# Patient Record
Sex: FEMALE | Race: OTHER | HISPANIC OR LATINO
[De-identification: names, ages, dates, MRNs, and addresses within clinical notes are randomized per-mention and may not be internally consistent; named-entity substitution may affect disease eponyms.]

---

## 2017-01-05 ENCOUNTER — APPOINTMENT (OUTPATIENT)
Dept: ENDOCRINOLOGY | Facility: CLINIC | Age: 42
End: 2017-01-05

## 2017-01-05 VITALS
HEIGHT: 67 IN | SYSTOLIC BLOOD PRESSURE: 140 MMHG | WEIGHT: 141 LBS | TEMPERATURE: 98.2 F | BODY MASS INDEX: 22.13 KG/M2 | HEART RATE: 100 BPM | DIASTOLIC BLOOD PRESSURE: 80 MMHG

## 2017-01-06 LAB — GLUCOSE BLDC GLUCOMTR-MCNC: 182

## 2017-01-19 ENCOUNTER — LABORATORY RESULT (OUTPATIENT)
Age: 42
End: 2017-01-19

## 2017-01-23 ENCOUNTER — APPOINTMENT (OUTPATIENT)
Dept: ENDOCRINOLOGY | Facility: CLINIC | Age: 42
End: 2017-01-23

## 2017-01-23 VITALS
BODY MASS INDEX: 22.13 KG/M2 | DIASTOLIC BLOOD PRESSURE: 76 MMHG | TEMPERATURE: 98 F | HEIGHT: 67 IN | WEIGHT: 141 LBS | HEART RATE: 104 BPM | SYSTOLIC BLOOD PRESSURE: 134 MMHG

## 2017-01-23 LAB — GLUCOSE BLDC GLUCOMTR-MCNC: 83

## 2017-01-23 RX ORDER — INSULIN GLARGINE 100 [IU]/ML
100 INJECTION, SOLUTION SUBCUTANEOUS
Qty: 9 | Refills: 0 | Status: ACTIVE | COMMUNITY
Start: 2017-01-23 | End: 1900-01-01

## 2017-01-23 RX ORDER — INSULIN ASPART 100 [IU]/ML
100 INJECTION, SOLUTION INTRAVENOUS; SUBCUTANEOUS
Qty: 6 | Refills: 3 | Status: ACTIVE | COMMUNITY
Start: 2017-01-23 | End: 1900-01-01

## 2017-01-24 ENCOUNTER — FORM ENCOUNTER (OUTPATIENT)
Age: 42
End: 2017-01-24

## 2017-01-24 LAB
ALBUMIN SERPL ELPH-MCNC: 4.6 G/DL
ALP BLD-CCNC: 60 U/L
ALT SERPL-CCNC: 15 U/L
ANION GAP SERPL CALC-SCNC: 17 MMOL/L
APPEARANCE: ABNORMAL
AST SERPL-CCNC: 15 U/L
BASOPHILS # BLD AUTO: 0.05 K/UL
BASOPHILS NFR BLD AUTO: 1 %
BILIRUB SERPL-MCNC: 0.4 MG/DL
BILIRUBIN URINE: NEGATIVE
BLOOD URINE: NEGATIVE
BUN SERPL-MCNC: 14 MG/DL
CALCIUM SERPL-MCNC: 9.3 MG/DL
CHLORIDE SERPL-SCNC: 103 MMOL/L
CO2 SERPL-SCNC: 22 MMOL/L
COLOR: YELLOW
CREAT SERPL-MCNC: 0.74 MG/DL
EOSINOPHIL # BLD AUTO: 0.41 K/UL
EOSINOPHIL NFR BLD AUTO: 8.2 %
GLUCOSE QUALITATIVE U: NORMAL MG/DL
GLUCOSE SERPL-MCNC: 183 MG/DL
HBA1C MFR BLD HPLC: 8.1 %
HCT VFR BLD CALC: 39.5 %
HGB BLD-MCNC: 12.5 G/DL
IMM GRANULOCYTES NFR BLD AUTO: 0.4 %
INR PPP: 0.99 RATIO
KETONES URINE: NEGATIVE
LEUKOCYTE ESTERASE URINE: ABNORMAL
LYMPHOCYTES # BLD AUTO: 1.73 K/UL
LYMPHOCYTES NFR BLD AUTO: 34.8 %
MAN DIFF?: NORMAL
MCHC RBC-ENTMCNC: 25.5 PG
MCHC RBC-ENTMCNC: 31.6 GM/DL
MCV RBC AUTO: 80.6 FL
MONOCYTES # BLD AUTO: 0.61 K/UL
MONOCYTES NFR BLD AUTO: 12.3 %
NEUTROPHILS # BLD AUTO: 2.15 K/UL
NEUTROPHILS NFR BLD AUTO: 43.3 %
NITRITE URINE: NEGATIVE
PH URINE: 5
PLATELET # BLD AUTO: 269 K/UL
POTASSIUM SERPL-SCNC: 4.2 MMOL/L
PROT SERPL-MCNC: 7.8 G/DL
PROTEIN URINE: NEGATIVE MG/DL
PT BLD: 11.2 SEC
RBC # BLD: 4.9 M/UL
RBC # FLD: 14.2 %
SODIUM SERPL-SCNC: 142 MMOL/L
SPECIFIC GRAVITY URINE: 1.02
UROBILINOGEN URINE: NORMAL MG/DL
WBC # FLD AUTO: 4.97 K/UL

## 2017-01-25 ENCOUNTER — OUTPATIENT (OUTPATIENT)
Dept: OUTPATIENT SERVICES | Facility: HOSPITAL | Age: 42
LOS: 1 days | End: 2017-01-25
Payer: COMMERCIAL

## 2017-01-25 ENCOUNTER — APPOINTMENT (OUTPATIENT)
Dept: INTERNAL MEDICINE | Facility: CLINIC | Age: 42
End: 2017-01-25

## 2017-01-25 VITALS
WEIGHT: 142 LBS | BODY MASS INDEX: 22.29 KG/M2 | DIASTOLIC BLOOD PRESSURE: 76 MMHG | TEMPERATURE: 98.2 F | HEART RATE: 110 BPM | HEIGHT: 67 IN | SYSTOLIC BLOOD PRESSURE: 126 MMHG

## 2017-01-25 DIAGNOSIS — Z01.818 ENCOUNTER FOR OTHER PREPROCEDURAL EXAMINATION: ICD-10-CM

## 2017-01-25 DIAGNOSIS — M75.102 UNSPECIFIED ROTATOR CUFF TEAR OR RUPTURE OF LEFT SHOULDER, NOT SPECIFIED AS TRAUMATIC: ICD-10-CM

## 2017-01-25 DIAGNOSIS — E11.9 TYPE 2 DIABETES MELLITUS W/OUT COMPLICATIONS: ICD-10-CM

## 2017-01-25 PROCEDURE — 71046 X-RAY EXAM CHEST 2 VIEWS: CPT

## 2017-01-25 PROCEDURE — 71020: CPT | Mod: 26

## 2017-01-25 RX ORDER — AMITRIPTYLINE HYDROCHLORIDE 25 MG/1
25 TABLET, FILM COATED ORAL
Qty: 30 | Refills: 0 | Status: ACTIVE | COMMUNITY
Start: 2016-07-06

## 2017-02-02 ENCOUNTER — MEDICATION RENEWAL (OUTPATIENT)
Age: 42
End: 2017-02-02

## 2017-02-02 RX ORDER — INSULIN GLARGINE 100 [IU]/ML
100 INJECTION, SOLUTION SUBCUTANEOUS
Qty: 9 | Refills: 0 | Status: ACTIVE | COMMUNITY
Start: 2017-02-02 | End: 1900-01-01

## 2017-02-02 RX ORDER — SIMVASTATIN 10 MG/1
10 TABLET, FILM COATED ORAL DAILY
Qty: 90 | Refills: 0 | Status: ACTIVE | COMMUNITY
Start: 2017-02-02 | End: 1900-01-01

## 2017-02-02 RX ORDER — INSULIN ASPART 100 [IU]/ML
100 INJECTION, SOLUTION INTRAVENOUS; SUBCUTANEOUS
Qty: 6 | Refills: 3 | Status: ACTIVE | COMMUNITY
Start: 2017-02-02 | End: 1900-01-01

## 2017-02-07 ENCOUNTER — OUTPATIENT (OUTPATIENT)
Dept: OUTPATIENT SERVICES | Facility: HOSPITAL | Age: 42
LOS: 1 days | End: 2017-02-07
Payer: COMMERCIAL

## 2017-02-07 VITALS
SYSTOLIC BLOOD PRESSURE: 120 MMHG | DIASTOLIC BLOOD PRESSURE: 80 MMHG | TEMPERATURE: 98 F | OXYGEN SATURATION: 98 % | WEIGHT: 141.98 LBS | RESPIRATION RATE: 16 BRPM | HEART RATE: 90 BPM | HEIGHT: 67 IN

## 2017-02-07 DIAGNOSIS — M75.42 IMPINGEMENT SYNDROME OF LEFT SHOULDER: ICD-10-CM

## 2017-02-07 DIAGNOSIS — Z01.818 ENCOUNTER FOR OTHER PREPROCEDURAL EXAMINATION: ICD-10-CM

## 2017-02-07 DIAGNOSIS — S43.422A SPRAIN OF LEFT ROTATOR CUFF CAPSULE, INITIAL ENCOUNTER: ICD-10-CM

## 2017-02-07 DIAGNOSIS — M75.02 ADHESIVE CAPSULITIS OF LEFT SHOULDER: ICD-10-CM

## 2017-02-07 LAB — HCG UR QL: NEGATIVE — SIGNIFICANT CHANGE UP

## 2017-02-07 PROCEDURE — 81025 URINE PREGNANCY TEST: CPT

## 2017-02-07 PROCEDURE — G0463: CPT

## 2017-02-07 NOTE — H&P PST ADULT - HISTORY OF PRESENT ILLNESS
41 years old female presented with left shoulder pain and limited ROM x 1 year, getting worse. Diagnosed with impingement syndrome of left shoulder and sprain of left total rotator cuff capsule, adhesive capsulitis of left shoulder and is scheduled for left shoulder arthroscopy on 2/9/17.

## 2017-02-07 NOTE — H&P PST ADULT - PMH
Acquired hypothyroidism    Diabetes mellitus, type 2    Hyperthyroidism  pt had hypothyroism 3 years ago , was on Levothyroxine and went to hyper thyroidism with tx ( medicine) and now eurothyroid x 2 years  Positional vertigo

## 2017-02-07 NOTE — H&P PST ADULT - DOES PATIENT HAVE ADVANCE DIRECTIVE
HCP form given to pt at [facility], in case of emergency call patient's  brother Sky Preciado [phone]/No

## 2017-02-07 NOTE — H&P PST ADULT - ASSESSMENT
41 years old female presented with  impingement syndrome of left shoulder and sprain of left total rotator cuff capsule, adhesive capsulitis of left shoulder.

## 2017-02-07 NOTE — H&P PST ADULT - NSANTHOSAYNRD_GEN_A_CORE
No. ANGEL screening performed.  STOP BANG Legend: 0-2 = LOW Risk; 3-4 = INTERMEDIATE Risk; 5-8 = HIGH Risk

## 2017-02-07 NOTE — H&P PST ADULT - VISION (WITH CORRECTIVE LENSES IF THE PATIENT USUALLY WEARS THEM):
Partially impaired: cannot see medication labels or newsprint, but can see obstacles in path, and the surrounding layout; can count fingers at arm's length/corrective lenses

## 2017-02-07 NOTE — H&P PST ADULT - FAMILY HISTORY
Father  Still living? No  Hypertension, Age at diagnosis: Age Unknown     Mother  Still living? Yes, Estimated age: Age Unknown  Hypertension, Age at diagnosis: Age Unknown

## 2017-02-07 NOTE — H&P PST ADULT - NEGATIVE NEUROLOGICAL SYMPTOMS
no transient paralysis/no difficulty walking/no syncope/no focal seizures/no paresthesias/no weakness

## 2017-02-07 NOTE — H&P PST ADULT - PROBLEM SELECTOR PLAN 2
Patient is scheduled for left shoulder arthroscopy on 2/9/17.Patient is at moderate risk for this surgery.

## 2017-02-07 NOTE — H&P PST ADULT - MUSCULOSKELETAL
details… detailed exam no joint erythema/no joint swelling/TTP to left shoulder/no calf tenderness/decreased ROM due to pain/no joint warmth

## 2017-02-09 RX ORDER — ENOXAPARIN SODIUM 100 MG/ML
15 INJECTION SUBCUTANEOUS
Qty: 0 | Refills: 0 | COMMUNITY

## 2017-02-09 RX ORDER — SIMVASTATIN 20 MG/1
1 TABLET, FILM COATED ORAL
Qty: 0 | Refills: 0 | COMMUNITY

## 2017-02-09 RX ORDER — ERGOCALCIFEROL 1.25 MG/1
1 CAPSULE ORAL
Qty: 0 | Refills: 0 | COMMUNITY

## 2017-02-09 RX ORDER — ACETAMINOPHEN 500 MG
2 TABLET ORAL
Qty: 0 | Refills: 0 | COMMUNITY

## 2017-02-09 RX ORDER — GLYBURIDE 5 MG
1 TABLET ORAL
Qty: 0 | Refills: 0 | COMMUNITY

## 2017-02-09 RX ORDER — INSULIN ASPART 100 [IU]/ML
5 INJECTION, SOLUTION SUBCUTANEOUS
Qty: 0 | Refills: 0 | COMMUNITY

## 2017-02-09 RX ORDER — METFORMIN HYDROCHLORIDE 850 MG/1
1 TABLET ORAL
Qty: 0 | Refills: 0 | COMMUNITY

## 2017-04-07 ENCOUNTER — MEDICATION RENEWAL (OUTPATIENT)
Age: 42
End: 2017-04-07

## 2017-04-07 RX ORDER — INSULIN GLARGINE 100 [IU]/ML
100 INJECTION, SOLUTION SUBCUTANEOUS
Qty: 4 | Refills: 0 | Status: ACTIVE | COMMUNITY
Start: 2017-04-07 | End: 1900-01-01

## 2017-07-03 ENCOUNTER — MEDICATION RENEWAL (OUTPATIENT)
Age: 42
End: 2017-07-03

## 2017-07-03 RX ORDER — SIMVASTATIN 10 MG/1
10 TABLET, FILM COATED ORAL DAILY
Qty: 30 | Refills: 0 | Status: ACTIVE | COMMUNITY
Start: 2017-07-03 | End: 1900-01-01

## 2017-10-07 NOTE — H&P PST ADULT - TOBACCO USE
A-fib (Atrial Fibrillation)   WHAT YOU NEED TO KNOW:   A-fib may come and go, or it may be a long-term condition  A-fib can cause blood clots, stroke, or heart failure  These conditions may become life-threatening  It is important to treat and manage a-fib to help prevent a blood clot, stroke, or heart failure  DISCHARGE INSTRUCTIONS:   Call 911 for any of the following:   · You have any of the following signs of a heart attack:      ¨ Squeezing, pressure, or pain in your chest that lasts longer than 5 minutes or returns    ¨ Discomfort or pain in your back, neck, jaw, stomach, or arm     ¨ Trouble breathing    ¨ Nausea or vomiting    ¨ Lightheadedness or a sudden cold sweat, especially with chest pain or trouble breathing    · You have any of the following signs of a stroke:      ¨ Numbness or drooping on one side of your face     ¨ Weakness in an arm or leg    ¨ Confusion or difficulty speaking    ¨ Dizziness, a severe headache, or vision loss  Return to the emergency department if:  You have any of the following signs of a blood clot:  · You feel lightheaded, are short of breath, and have chest pain  · You cough up blood  · You have swelling, redness, pain, or warmth in your arm or leg  Contact your cardiologist or healthcare provider if:   · Your target heart rate is not in the range it should be  · You have new or worsening swelling in your legs, feet, ankles, or abdomen  · You are short of breath, even at rest      · You have questions or concerns about your condition or care  Medicines: You may need any of the following:  · Heart medicines  help control your heart rate and rhythm  You may need more than one medicine to treat your symptoms  · Blood thinners    help prevent blood clots  Examples of blood thinners include heparin and warfarin  Clots can cause strokes, heart attacks, and death   The following are general safety guidelines to follow while you are taking a blood thinner:    ¨ Watch for bleeding and bruising while you take blood thinners  Watch for bleeding from your gums or nose  Watch for blood in your urine and bowel movements  Use a soft washcloth on your skin, and a soft toothbrush to brush your teeth  This can keep your skin and gums from bleeding  If you shave, use an electric shaver  Do not play contact sports  ¨ Tell your dentist and other healthcare providers that you take anticoagulants  Wear a bracelet or necklace that says you take this medicine  ¨ Do not start or stop any medicines unless your healthcare provider tells you to  Many medicines cannot be used with blood thinners  ¨ Tell your healthcare provider right away if you forget to take the medicine, or if you take too much  ¨ Warfarin  is a blood thinner that you may need to take  The following are things you should be aware of if you take warfarin  § Foods and medicines can affect the amount of warfarin in your blood  Do not make major changes to your diet while you take warfarin  Warfarin works best when you eat about the same amount of vitamin K every day  Vitamin K is found in green leafy vegetables and certain other foods  Ask for more information about what to eat when you are taking warfarin  § You will need to see your healthcare provider for follow-up visits when you are on warfarin  You will need regular blood tests  These tests are used to decide how much medicine you need  · Antiplatelets , such as aspirin, help prevent blood clots  Take your antiplatelet medicine exactly as directed  These medicines make it more likely for you to bleed or bruise  If you are told to take aspirin, do not take acetaminophen or ibuprofen instead  · Take your medicine as directed  Contact your healthcare provider if you think your medicine is not helping or if you have side effects  Tell him or her if you are allergic to any medicine   Keep a list of the medicines, vitamins, and herbs you take  Include the amounts, and when and why you take them  Bring the list or the pill bottles to follow-up visits  Carry your medicine list with you in case of an emergency  Follow up with your cardiologist as directed: You will need regular blood tests and monitoring  Write down your questions so you remember to ask them during your visits  Manage A-fib:   · Know your target heart rate  Learn how to take your pulse and monitor your heart rate  · Manage other health conditions  This includes high blood pressure, sleep apnea, thyroid disease, diabetes, and other heart conditions  Take medicine as directed and follow your treatment plan  · Limit or do not drink alcohol  Alcohol can make a-fib hard to manage  Ask your healthcare provider if it is safe for you to drink alcohol  A drink of alcohol is 12 ounces of beer, 5 ounces of wine, or 1½ ounces of liquor  · Do not smoke  Nicotine and other chemicals in cigarettes and cigars can cause heart and lung damage  Ask your healthcare provider for information if you currently smoke and need help to quit  E-cigarettes or smokeless tobacco still contain nicotine  Talk to your healthcare provider before you use these products  · Eat heart-healthy foods  Heart healthy foods will help keep your cholesterol low  These include fruits, vegetables, whole-grain breads, low-fat dairy products, beans, lean meats, and fish  Replace butter and margarine with heart-healthy oils such as olive oil and canola oil  · Maintain a healthy weight  Ask your healthcare provider how much you should weigh  Ask him to help you create a weight loss plan if you are overweight  · Exercise for 30 minutes  most days of the week  Ask your healthcare provider about the best exercise plan for you  © 2017 [employer] Information is for End User's use only and may not be sold, redistributed or otherwise used for commercial purposes   All illustrations and images included in CareNotes® are the copyrighted property of [employer]  or Kavon Akins  The above information is an  only  It is not intended as medical advice for individual conditions or treatments  Talk to your doctor, nurse or pharmacist before following any medical regimen to see if it is safe and effective for you  Blood Thinners   WHAT YOU NEED TO KNOW:   Blood thinners are medicines that prevent blood clots from forming in an artery, vein, or the heart  These medicines may also prevent a blood clot from getting bigger  Blood clots prevent the flow of blood to organs and tissues such as the heart or a leg  The two main types of blood thinners are antiplatelet medicine and anticoagulant medicine  Antiplatelet medicine prevents platelets from sticking together and forming a clot  Anticoagulant medicine prevents the blood from clotting too much  DISCHARGE INSTRUCTIONS:   Call 911 for any of the following:   · You have any of the following signs of a heart attack:      ¨ Squeezing, pressure, or pain in your chest that lasts longer than 5 minutes or returns    ¨ Discomfort or pain in your back, neck, jaw, stomach, or arm     ¨ Trouble breathing    ¨ Nausea or vomiting    ¨ Lightheadedness or a sudden cold sweat, especially with chest pain or trouble breathing    · You have any of the following signs of a stroke:      ¨ Numbness or drooping on one side of your face     ¨ Weakness in an arm or leg    ¨ Confusion or difficulty speaking    ¨ Dizziness, a severe headache, or vision loss    · You feel lightheaded, short of breath, and have chest pain  · You cough up blood  · You have trouble breathing  · You are bleeding from a wound or skin injury and it won't stop after holding pressure for 10 minutes  · You have a fall or injury to the head  Return to the emergency department immediately if:   · You have a bad stomachache or headache that does not go away  · You feel weak, faint, or dizzy  · You vomit blood  · You have a fall or injury to any part of your body other than your head  · You are pregnant or think you may be pregnant  Contact your healthcare provider if:   · Your urine is red or dark brown  · Your bowel movements are red, dark brown, or black  · You bleed more than usual during your period  · You find bruises or blood blisters on your skin  · Your skin is itchy, swollen, or you have a rash  · You have questions or concerns about your condition or care  Foods and medicines to avoid:  Do not start any new medicines, vitamins, or herbal supplements before you talk to your healthcare provider  Do not make changes to your diet without talking to your healthcare provider  There are many medicines, vitamins, and herbal supplements that may prevent blood thinners from working correctly  Ask your healthcare provider for a full list of foods and medicines that can prevent anticoagulants from working correctly  The following may cause severe bleeding, or prevent anticoagulants from working correctly:  · Alcohol  may increase your risk for bleeding when taken with anticoagulants  Do not drink alcohol unless your healthcare provider says it is okay  · Changes in your regular vitamin K intake  can prevent anticoagulants, such as warfarin, from working correctly  Anticoagulants work best if you eat the same amount of vitamin K every day  Some foods that contain vitamin K include spinach, kale, broccoli, nidhi lettuce, tawnya greens, and kiwi  Ask your healthcare provider for more information about foods that contain vitamin K      · NSAIDs  may increase your risk for bleeding  Examples include ibuprofen, aspirin, and naproxen  Do not take these medicines unless your healthcare provider says it is okay  · Some antibiotics  may increase your risk for bleeding  Talk to your healthcare provider before you take antibiotics  · Alternative medicines  such as ginkgo biloba, garlic, chamomile, and St  Adriel's wort, should not be taken with anticoagulants  Some may prevent anticoagulants from working, and others may increase your risk for bleeding  Self-care:   · Do not play contact sports  This may increase your risk for bleeding if you get injured or hit  Walk, swim, or do yoga to get exercise  Ask your healthcare provider about other exercises that are safe  · Use an electric razor to shave  This may prevent cuts that could bleed  · Use a soft bristled tooth brush and wax floss  This may prevent bleeding from your gums  · Prevent falls in and out of your home  Wear non-slip shoes or slippers when you are out of bed  Keep walkways clear  Remove throw rugs and other objects that can cause you to trip and fall  Use assistive devices as directed  · Be careful with sharp objects  Wear gloves when you work outside with sharp tools or in the garden  · Use contraception to prevent a pregnancy  Anticoagulants can cause problems with your baby, and dangerous bleeding during pregnancy  · Carry your medicine with you when you travel  This will prevent you from missing a dose of medicine if your bag gets lost  Talk to your healthcare provider before you travel  · Wear or carry a medical alert bracelet or necklace at all times  This will alert healthcare providers that you take an anticoagulant if you are unconscious or need emergency medical treatment  Other important information:   · Tell all of your healthcare providers and dentist that you take a blood thinner  This will help them plan for procedures and surgeries  It will also prevent them from giving you medicine that may interact with your blood thinner  · You should not take antiplatelet medicine  if you have liver or kidney disease, a peptic ulcer, or gastrointestinal disease   You should also not take this medicine if you have a bleeding disorder, uncontrolled asthma, or uncontrolled high blood pressure  These conditions may increase your risk for bleeding  · Take anticoagulants exactly as prescribed  Do not double up on a dose if you have missed a dose  Some anticoagulants should be taken at the same time every day  · Keep appointments for blood tests  if you are taking warfarin  Blood tests will help your healthcare provider make changes to your dose of anticoagulants so that they work correctly  The blood test will measure the amount of time it takes for your blood to clot  This is called the international normalized ratio (INR)  If your INR is too high, you may be at an increased risk for bleeding  If your INR is too low, you may be at an increased risk for blood clots  Your healthcare provider may change the dose of your anticoagulant medicine depending on the results of your blood test   Follow up with your healthcare provider as directed:  Write down your questions so you remember to ask them during your visits  © 2017 [employer] Information is for End User's use only and may not be sold, redistributed or otherwise used for commercial purposes  All illustrations and images included in CareNotes® are the copyrighted property of [employer]  or Kavon Akins  The above information is an  only  It is not intended as medical advice for individual conditions or treatments  Talk to your doctor, nurse or pharmacist before following any medical regimen to see if it is safe and effective for you  Never smoker

## 2021-05-03 NOTE — H&P PST ADULT - PAIN DESCRIPTION (FREQUENCY/QUALITY), PROFILE
Pt returned call.  Pt sees PCP today at 2pm and will discuss inhaler at that time. She declines for me to send one in at this time.    sharp/frequent/aching